# Patient Record
Sex: MALE | Race: WHITE | NOT HISPANIC OR LATINO
[De-identification: names, ages, dates, MRNs, and addresses within clinical notes are randomized per-mention and may not be internally consistent; named-entity substitution may affect disease eponyms.]

---

## 2017-08-28 PROBLEM — Z00.00 ENCOUNTER FOR PREVENTIVE HEALTH EXAMINATION: Status: ACTIVE | Noted: 2017-08-28

## 2017-09-07 ENCOUNTER — APPOINTMENT (OUTPATIENT)
Dept: OTOLARYNGOLOGY | Facility: CLINIC | Age: 72
End: 2017-09-07
Payer: MEDICARE

## 2017-09-07 ENCOUNTER — APPOINTMENT (OUTPATIENT)
Dept: PHARMACY | Facility: CLINIC | Age: 72
End: 2017-09-07
Payer: MEDICARE

## 2017-09-07 ENCOUNTER — APPOINTMENT (OUTPATIENT)
Dept: OTOLARYNGOLOGY | Facility: CLINIC | Age: 72
End: 2017-09-07
Payer: COMMERCIAL

## 2017-09-07 VITALS — HEART RATE: 75 BPM | OXYGEN SATURATION: 97 % | DIASTOLIC BLOOD PRESSURE: 71 MMHG | SYSTOLIC BLOOD PRESSURE: 110 MMHG

## 2017-09-07 VITALS — HEIGHT: 69 IN | BODY MASS INDEX: 22.22 KG/M2 | WEIGHT: 150 LBS

## 2017-09-07 DIAGNOSIS — H90.42 SENSORINEURAL HEARING LOSS, UNILATERAL, LEFT EAR, WITH UNRESTRICTED HEARING ON THE CONTRALATERAL SIDE: ICD-10-CM

## 2017-09-07 DIAGNOSIS — H91.90 UNSPECIFIED HEARING LOSS, UNSPECIFIED EAR: ICD-10-CM

## 2017-09-07 DIAGNOSIS — Z78.9 OTHER SPECIFIED HEALTH STATUS: ICD-10-CM

## 2017-09-07 PROCEDURE — 99202 OFFICE O/P NEW SF 15 MIN: CPT | Mod: NC

## 2017-09-07 PROCEDURE — V5010 ASSESSMENT FOR HEARING AID: CPT

## 2017-09-07 PROCEDURE — 92550 TYMPANOMETRY & REFLEX THRESH: CPT

## 2017-09-07 PROCEDURE — 92557 COMPREHENSIVE HEARING TEST: CPT

## 2017-09-07 RX ORDER — ATORVASTATIN CALCIUM 80 MG/1
TABLET, FILM COATED ORAL
Refills: 0 | Status: ACTIVE | COMMUNITY

## 2017-09-15 ENCOUNTER — APPOINTMENT (OUTPATIENT)
Dept: PHARMACY | Facility: CLINIC | Age: 72
End: 2017-09-15

## 2017-09-15 ENCOUNTER — APPOINTMENT (OUTPATIENT)
Dept: PHARMACY | Facility: CLINIC | Age: 72
End: 2017-09-15
Payer: SELF-PAY

## 2017-09-15 PROCEDURE — V5299A: CUSTOM | Mod: NC

## 2017-09-20 ENCOUNTER — APPOINTMENT (OUTPATIENT)
Dept: PHARMACY | Facility: CLINIC | Age: 72
End: 2017-09-20
Payer: SELF-PAY

## 2017-09-20 PROCEDURE — V5299A: CUSTOM | Mod: NC

## 2018-06-09 ENCOUNTER — INPATIENT (INPATIENT)
Facility: HOSPITAL | Age: 73
LOS: 0 days | Discharge: HOME | DRG: 131 | End: 2018-06-10
Attending: INTERNAL MEDICINE | Admitting: INTERNAL MEDICINE
Payer: COMMERCIAL

## 2018-06-09 VITALS
TEMPERATURE: 98 F | SYSTOLIC BLOOD PRESSURE: 127 MMHG | OXYGEN SATURATION: 100 % | HEART RATE: 72 BPM | DIASTOLIC BLOOD PRESSURE: 76 MMHG | RESPIRATION RATE: 18 BRPM

## 2018-06-09 DIAGNOSIS — E78.5 HYPERLIPIDEMIA, UNSPECIFIED: ICD-10-CM

## 2018-06-09 DIAGNOSIS — Z90.2 ACQUIRED ABSENCE OF LUNG [PART OF]: Chronic | ICD-10-CM

## 2018-06-09 DIAGNOSIS — E87.1 HYPO-OSMOLALITY AND HYPONATREMIA: ICD-10-CM

## 2018-06-09 DIAGNOSIS — R55 SYNCOPE AND COLLAPSE: ICD-10-CM

## 2018-06-09 DIAGNOSIS — S02.609B FRACTURE OF MANDIBLE, UNSPECIFIED, INITIAL ENCOUNTER FOR OPEN FRACTURE: ICD-10-CM

## 2018-06-09 DIAGNOSIS — Z29.9 ENCOUNTER FOR PROPHYLACTIC MEASURES, UNSPECIFIED: ICD-10-CM

## 2018-06-09 DIAGNOSIS — R63.8 OTHER SYMPTOMS AND SIGNS CONCERNING FOOD AND FLUID INTAKE: ICD-10-CM

## 2018-06-09 LAB
ALBUMIN SERPL ELPH-MCNC: 4.1 G/DL — SIGNIFICANT CHANGE UP (ref 3.3–5)
ALP SERPL-CCNC: 68 U/L — SIGNIFICANT CHANGE UP (ref 40–120)
ALT FLD-CCNC: 19 U/L — SIGNIFICANT CHANGE UP (ref 10–45)
ANION GAP SERPL CALC-SCNC: 12 MMOL/L — SIGNIFICANT CHANGE UP (ref 5–17)
ANION GAP SERPL CALC-SCNC: 16 MMOL/L — SIGNIFICANT CHANGE UP (ref 5–17)
ANION GAP SERPL CALC-SCNC: 17 MMOL/L — SIGNIFICANT CHANGE UP (ref 5–17)
ANION GAP SERPL CALC-SCNC: 9 MMOL/L — SIGNIFICANT CHANGE UP (ref 5–17)
AST SERPL-CCNC: 34 U/L — SIGNIFICANT CHANGE UP (ref 10–40)
BILIRUB SERPL-MCNC: 0.3 MG/DL — SIGNIFICANT CHANGE UP (ref 0.2–1.2)
BUN SERPL-MCNC: 10 MG/DL — SIGNIFICANT CHANGE UP (ref 7–23)
BUN SERPL-MCNC: 14 MG/DL — SIGNIFICANT CHANGE UP (ref 7–23)
BUN SERPL-MCNC: 18 MG/DL — SIGNIFICANT CHANGE UP (ref 7–23)
BUN SERPL-MCNC: 19 MG/DL — SIGNIFICANT CHANGE UP (ref 7–23)
CALCIUM SERPL-MCNC: 8.9 MG/DL — SIGNIFICANT CHANGE UP (ref 8.4–10.5)
CALCIUM SERPL-MCNC: 9 MG/DL — SIGNIFICANT CHANGE UP (ref 8.4–10.5)
CALCIUM SERPL-MCNC: 9.1 MG/DL — SIGNIFICANT CHANGE UP (ref 8.4–10.5)
CALCIUM SERPL-MCNC: 9.7 MG/DL — SIGNIFICANT CHANGE UP (ref 8.4–10.5)
CHLORIDE SERPL-SCNC: 104 MMOL/L — SIGNIFICANT CHANGE UP (ref 96–108)
CHLORIDE SERPL-SCNC: 91 MMOL/L — LOW (ref 96–108)
CHLORIDE SERPL-SCNC: 93 MMOL/L — LOW (ref 96–108)
CHLORIDE SERPL-SCNC: 99 MMOL/L — SIGNIFICANT CHANGE UP (ref 96–108)
CK MB CFR SERPL CALC: 11.1 NG/ML — HIGH (ref 0–6.7)
CK SERPL-CCNC: 453 U/L — HIGH (ref 30–200)
CO2 SERPL-SCNC: 17 MMOL/L — LOW (ref 22–31)
CO2 SERPL-SCNC: 18 MMOL/L — LOW (ref 22–31)
CO2 SERPL-SCNC: 20 MMOL/L — LOW (ref 22–31)
CO2 SERPL-SCNC: 21 MMOL/L — LOW (ref 22–31)
CREAT SERPL-MCNC: 1.01 MG/DL — SIGNIFICANT CHANGE UP (ref 0.5–1.3)
CREAT SERPL-MCNC: 1.1 MG/DL — SIGNIFICANT CHANGE UP (ref 0.5–1.3)
CREAT SERPL-MCNC: 1.14 MG/DL — SIGNIFICANT CHANGE UP (ref 0.5–1.3)
CREAT SERPL-MCNC: 1.19 MG/DL — SIGNIFICANT CHANGE UP (ref 0.5–1.3)
GLUCOSE SERPL-MCNC: 117 MG/DL — HIGH (ref 70–99)
GLUCOSE SERPL-MCNC: 125 MG/DL — HIGH (ref 70–99)
GLUCOSE SERPL-MCNC: 129 MG/DL — HIGH (ref 70–99)
GLUCOSE SERPL-MCNC: 136 MG/DL — HIGH (ref 70–99)
MAGNESIUM SERPL-MCNC: 1.8 MG/DL — SIGNIFICANT CHANGE UP (ref 1.6–2.6)
MAGNESIUM SERPL-MCNC: 1.8 MG/DL — SIGNIFICANT CHANGE UP (ref 1.6–2.6)
OSMOLALITY SERPL: 266 MOSM/KG — LOW (ref 280–301)
OSMOLALITY UR: 257 MOSMOL/KG — SIGNIFICANT CHANGE UP (ref 100–650)
PCP SPEC-MCNC: SIGNIFICANT CHANGE UP
POTASSIUM SERPL-MCNC: 3.7 MMOL/L — SIGNIFICANT CHANGE UP (ref 3.5–5.3)
POTASSIUM SERPL-MCNC: 4.1 MMOL/L — SIGNIFICANT CHANGE UP (ref 3.5–5.3)
POTASSIUM SERPL-MCNC: 4.5 MMOL/L — SIGNIFICANT CHANGE UP (ref 3.5–5.3)
POTASSIUM SERPL-MCNC: 4.8 MMOL/L — SIGNIFICANT CHANGE UP (ref 3.5–5.3)
POTASSIUM SERPL-SCNC: 3.7 MMOL/L — SIGNIFICANT CHANGE UP (ref 3.5–5.3)
POTASSIUM SERPL-SCNC: 4.1 MMOL/L — SIGNIFICANT CHANGE UP (ref 3.5–5.3)
POTASSIUM SERPL-SCNC: 4.5 MMOL/L — SIGNIFICANT CHANGE UP (ref 3.5–5.3)
POTASSIUM SERPL-SCNC: 4.8 MMOL/L — SIGNIFICANT CHANGE UP (ref 3.5–5.3)
PROT SERPL-MCNC: 6.8 G/DL — SIGNIFICANT CHANGE UP (ref 6–8.3)
SODIUM SERPL-SCNC: 125 MMOL/L — LOW (ref 135–145)
SODIUM SERPL-SCNC: 127 MMOL/L — LOW (ref 135–145)
SODIUM SERPL-SCNC: 131 MMOL/L — LOW (ref 135–145)
SODIUM SERPL-SCNC: 134 MMOL/L — LOW (ref 135–145)
SODIUM UR-SCNC: 44 MMOL/L — SIGNIFICANT CHANGE UP
TROPONIN T SERPL-MCNC: <0.01 NG/ML — SIGNIFICANT CHANGE UP (ref 0–0.01)
TSH SERPL-MCNC: 2.59 UIU/ML — SIGNIFICANT CHANGE UP (ref 0.35–4.94)

## 2018-06-09 PROCEDURE — 70450 CT HEAD/BRAIN W/O DYE: CPT | Mod: 26

## 2018-06-09 PROCEDURE — 99223 1ST HOSP IP/OBS HIGH 75: CPT

## 2018-06-09 PROCEDURE — 72125 CT NECK SPINE W/O DYE: CPT | Mod: 26

## 2018-06-09 PROCEDURE — 93010 ELECTROCARDIOGRAM REPORT: CPT | Mod: 76,59

## 2018-06-09 PROCEDURE — 70486 CT MAXILLOFACIAL W/O DYE: CPT | Mod: 26

## 2018-06-09 PROCEDURE — 12051 INTMD RPR FACE/MM 2.5 CM/<: CPT

## 2018-06-09 PROCEDURE — 71045 X-RAY EXAM CHEST 1 VIEW: CPT | Mod: 26

## 2018-06-09 PROCEDURE — 99285 EMERGENCY DEPT VISIT HI MDM: CPT | Mod: 25

## 2018-06-09 RX ORDER — ACETAMINOPHEN 500 MG
650 TABLET ORAL EVERY 6 HOURS
Qty: 0 | Refills: 0 | Status: DISCONTINUED | OUTPATIENT
Start: 2018-06-09 | End: 2018-06-10

## 2018-06-09 RX ORDER — HEPARIN SODIUM 5000 [USP'U]/ML
5000 INJECTION INTRAVENOUS; SUBCUTANEOUS EVERY 8 HOURS
Qty: 0 | Refills: 0 | Status: DISCONTINUED | OUTPATIENT
Start: 2018-06-09 | End: 2018-06-10

## 2018-06-09 RX ORDER — ACETAMINOPHEN 500 MG
1000 TABLET ORAL ONCE
Qty: 0 | Refills: 0 | Status: COMPLETED | OUTPATIENT
Start: 2018-06-09 | End: 2018-06-09

## 2018-06-09 RX ORDER — ASPIRIN/CALCIUM CARB/MAGNESIUM 324 MG
1 TABLET ORAL
Qty: 0 | Refills: 0 | COMMUNITY

## 2018-06-09 RX ORDER — OXYCODONE AND ACETAMINOPHEN 5; 325 MG/1; MG/1
1 TABLET ORAL ONCE
Qty: 0 | Refills: 0 | Status: DISCONTINUED | OUTPATIENT
Start: 2018-06-09 | End: 2018-06-09

## 2018-06-09 RX ORDER — ATORVASTATIN CALCIUM 80 MG/1
20 TABLET, FILM COATED ORAL AT BEDTIME
Qty: 0 | Refills: 0 | Status: DISCONTINUED | OUTPATIENT
Start: 2018-06-09 | End: 2018-06-10

## 2018-06-09 RX ORDER — SUCRALFATE 1 G
10 TABLET ORAL
Qty: 0 | Refills: 0 | COMMUNITY

## 2018-06-09 RX ORDER — ACETAMINOPHEN 500 MG
1000 TABLET ORAL ONCE
Qty: 0 | Refills: 0 | Status: DISCONTINUED | OUTPATIENT
Start: 2018-06-09 | End: 2018-06-09

## 2018-06-09 RX ORDER — TADALAFIL 10 MG/1
2.5 TABLET, FILM COATED ORAL
Qty: 0 | Refills: 0 | COMMUNITY

## 2018-06-09 RX ORDER — SODIUM CHLORIDE 9 MG/ML
1000 INJECTION INTRAMUSCULAR; INTRAVENOUS; SUBCUTANEOUS ONCE
Qty: 0 | Refills: 0 | Status: COMPLETED | OUTPATIENT
Start: 2018-06-09 | End: 2018-06-09

## 2018-06-09 RX ORDER — TETANUS TOXOID, REDUCED DIPHTHERIA TOXOID AND ACELLULAR PERTUSSIS VACCINE, ADSORBED 5; 2.5; 8; 8; 2.5 [IU]/.5ML; [IU]/.5ML; UG/.5ML; UG/.5ML; UG/.5ML
0.5 SUSPENSION INTRAMUSCULAR ONCE
Qty: 0 | Refills: 0 | Status: COMPLETED | OUTPATIENT
Start: 2018-06-09 | End: 2018-06-09

## 2018-06-09 RX ORDER — POTASSIUM CHLORIDE 20 MEQ
40 PACKET (EA) ORAL ONCE
Qty: 0 | Refills: 0 | Status: DISCONTINUED | OUTPATIENT
Start: 2018-06-09 | End: 2018-06-09

## 2018-06-09 RX ORDER — ATORVASTATIN CALCIUM 80 MG/1
1 TABLET, FILM COATED ORAL
Qty: 0 | Refills: 0 | COMMUNITY

## 2018-06-09 RX ORDER — MAGNESIUM SULFATE 500 MG/ML
1 VIAL (ML) INJECTION ONCE
Qty: 0 | Refills: 0 | Status: DISCONTINUED | OUTPATIENT
Start: 2018-06-09 | End: 2018-06-09

## 2018-06-09 RX ORDER — CEFAZOLIN SODIUM 1 G
1000 VIAL (EA) INJECTION ONCE
Qty: 0 | Refills: 0 | Status: COMPLETED | OUTPATIENT
Start: 2018-06-09 | End: 2018-06-09

## 2018-06-09 RX ORDER — RANITIDINE HYDROCHLORIDE 150 MG/1
1 TABLET, FILM COATED ORAL
Qty: 0 | Refills: 0 | COMMUNITY

## 2018-06-09 RX ORDER — FAMOTIDINE 10 MG/ML
20 INJECTION INTRAVENOUS
Qty: 0 | Refills: 0 | Status: DISCONTINUED | OUTPATIENT
Start: 2018-06-09 | End: 2018-06-10

## 2018-06-09 RX ORDER — MELOXICAM 15 MG/1
1 TABLET ORAL
Qty: 0 | Refills: 0 | COMMUNITY

## 2018-06-09 RX ORDER — OXYCODONE AND ACETAMINOPHEN 5; 325 MG/1; MG/1
1 TABLET ORAL EVERY 6 HOURS
Qty: 0 | Refills: 0 | Status: DISCONTINUED | OUTPATIENT
Start: 2018-06-09 | End: 2018-06-09

## 2018-06-09 RX ORDER — SODIUM CHLORIDE 9 MG/ML
1000 INJECTION INTRAMUSCULAR; INTRAVENOUS; SUBCUTANEOUS
Qty: 0 | Refills: 0 | Status: DISCONTINUED | OUTPATIENT
Start: 2018-06-09 | End: 2018-06-10

## 2018-06-09 RX ORDER — MAGNESIUM SULFATE 500 MG/ML
2 VIAL (ML) INJECTION ONCE
Qty: 0 | Refills: 0 | Status: COMPLETED | OUTPATIENT
Start: 2018-06-09 | End: 2018-06-09

## 2018-06-09 RX ADMIN — Medication 400 MILLIGRAM(S): at 06:10

## 2018-06-09 RX ADMIN — Medication 650 MILLIGRAM(S): at 18:45

## 2018-06-09 RX ADMIN — ATORVASTATIN CALCIUM 20 MILLIGRAM(S): 80 TABLET, FILM COATED ORAL at 21:38

## 2018-06-09 RX ADMIN — SODIUM CHLORIDE 150 MILLILITER(S): 9 INJECTION INTRAMUSCULAR; INTRAVENOUS; SUBCUTANEOUS at 17:49

## 2018-06-09 RX ADMIN — Medication 1000 MILLIGRAM(S): at 06:48

## 2018-06-09 RX ADMIN — Medication 100 MILLIGRAM(S): at 04:17

## 2018-06-09 RX ADMIN — SODIUM CHLORIDE 150 MILLILITER(S): 9 INJECTION INTRAMUSCULAR; INTRAVENOUS; SUBCUTANEOUS at 06:24

## 2018-06-09 RX ADMIN — Medication 650 MILLIGRAM(S): at 18:14

## 2018-06-09 RX ADMIN — TETANUS TOXOID, REDUCED DIPHTHERIA TOXOID AND ACELLULAR PERTUSSIS VACCINE, ADSORBED 0.5 MILLILITER(S): 5; 2.5; 8; 8; 2.5 SUSPENSION INTRAMUSCULAR at 03:02

## 2018-06-09 RX ADMIN — Medication 50 GRAM(S): at 07:35

## 2018-06-09 RX ADMIN — Medication 1 DROP(S): at 18:15

## 2018-06-09 RX ADMIN — OXYCODONE AND ACETAMINOPHEN 1 TABLET(S): 5; 325 TABLET ORAL at 22:30

## 2018-06-09 RX ADMIN — FAMOTIDINE 20 MILLIGRAM(S): 10 INJECTION INTRAVENOUS at 17:50

## 2018-06-09 RX ADMIN — SODIUM CHLORIDE 1000 MILLILITER(S): 9 INJECTION INTRAMUSCULAR; INTRAVENOUS; SUBCUTANEOUS at 03:03

## 2018-06-09 RX ADMIN — SODIUM CHLORIDE 150 MILLILITER(S): 9 INJECTION INTRAMUSCULAR; INTRAVENOUS; SUBCUTANEOUS at 23:45

## 2018-06-09 RX ADMIN — Medication 1 DROP(S): at 23:54

## 2018-06-09 NOTE — ED PROVIDER NOTE - OBJECTIVE STATEMENT
73M hx vasovagal syncope in the past, s/p syncopal event. pt states was playing poker and suddenly felt lightheaded. states he felt like he was going to pass out and tried to put his head down.  however passed out before he could. +hit face, +laceration to chin. c/o jaw pain.  unk last tetanus. no vomiting, no fevers. no chest pain, nO SOB. no numbness/weakness.  c/o mild HA.  states has been worked up by cardiologist in past.    during HPI - pt again stated he felt lightheaded and passed out in stretcher.  was unresponsive for a few seconds.  repeat EKG performed.  pt placed on cardiac monitor, no hypotension.  FS WNL

## 2018-06-09 NOTE — H&P ADULT - ATTENDING COMMENTS
-pt seen and examined  -episode of probable vasovagal syncope provoked by etoh, thc, massage  -advised to avoid these things including   -na noted to be low, with low urine osm, but improving with IVF, plan to recheck, bmp, if remains low, consult nephrology  -EKG unremarkable other 1st deg AV block  -obtain echo  -stop cialis  -no contraindication CV wise to indicated ENT surgery, ideally have NA optimized 1st

## 2018-06-09 NOTE — ED PROVIDER NOTE - PROGRESS NOTE DETAILS
laceration repaired, bacitracin applied. given ancef spoke with Dr. Cuba on for OMFS  will admit to cardiology for multiple syncopal episodes, will need continued cardiac monitoring

## 2018-06-09 NOTE — H&P ADULT - ASSESSMENT
72 yo M w/ PMHx of multiple vasovagal episodes, Stage 1A lung adenocarcinoma s/p LLL resection (2016), HLD presents after syncopal episode found to have multiple mandibular fractures that will require surgical intervention.

## 2018-06-09 NOTE — H&P ADULT - NSHPLABSRESULTS_GEN_ALL_CORE
LABS:                         11.8   11.8  )-----------( 248      ( 2018 02:26 )             32.6         127<L>  |  93<L>  |  18  ----------------------------<  129<H>  4.1   |  17<L>  |  1.14    Ca    8.9      2018 05:15  Mg     1.8         TPro  6.8  /  Alb  4.1  /  TBili  0.3  /  DBili  x   /  AST  34  /  ALT  19  /  AlkPhos  68  06-    PT/INR - ( 2018 02:26 )   PT: 12.3 sec;   INR: 1.11          PTT - ( 2018 02:26 )  PTT:27.9 sec  Urinalysis Basic - ( 2018 03:44 )    Color: Yellow / Appearance: Clear / S.010 / pH: x  Gluc: x / Ketone: Trace mg/dL  / Bili: Negative / Urobili: 0.2 E.U./dL   Blood: x / Protein: NEGATIVE mg/dL / Nitrite: NEGATIVE   Leuk Esterase: NEGATIVE / RBC: x / WBC x   Sq Epi: x / Non Sq Epi: x / Bacteria: x      CARDIAC MARKERS ( 2018 05:15 )  x     / <0.01 ng/mL / 499 U/L / x     / 11.1 ng/mL  CARDIAC MARKERS ( 2018 02:26 )  x     / <0.01 ng/mL / 453 U/L / x     / 10.0 ng/mL            RADIOLOGY, EKG & ADDITIONAL TESTS:   EKG: NSR, 1st degree AV block

## 2018-06-09 NOTE — CONSULT NOTE ADULT - SUBJECTIVE AND OBJECTIVE BOX
74 yo M w/ PMHx of multiple vasovagal episodes, Stage 1A lung adenocarcinoma s/p LLL resection (2016), HLD presents after syncopal episode. As per admission history:  Pt reports that he has had nearly 30 vasovagal episodes in the past, previously thought to be 2/2 BB use (atenolol) which has been stopped since 2016; frequency of spells has decreased since BB use.  Pt follows regularly w/ his Cardiologist (Dr. Wade Henning) at [facility].  Pt reports that yesterday he was in the sun playing volleyball; in the evening, he was playing poker, had 3 drinks and smoked marijuana.  He got a massage and soon after, felt lightheaded and knew that he was about to "vasovagal" and tried to walk over to a couch; however, he fell on his face.  Pt took Cialis last night, denies previous adverse reaction to medication. Pt can recall event. Endorses malocclusion, bilateral facial pain and pain at chin.     Exam:  Gen: Lying in bed, pleasant, NAD  Neuro: a/ox3, CNII-XII grossly intact bilaterally  HEENT: repaired chin laceration, tenderness and mild edema at bilateral face overlying mandibular condyles, pain when trying to occlude dentition, teeth #23-26 mobile, FOM ecchymosis without elevation, no mandibular mobility.

## 2018-06-09 NOTE — ED ADULT NURSE NOTE - OBJECTIVE STATEMENT
74 y/o male presents to the ED s/p witnessed syncopal fall to ground. Pt. states, "I had a vasal vagal response and hit the floor." Pt is observed to have deep open wound to chin approximately 2.5 cm in length and 0.2cm in diameter. Pt reports falling to ground face first, then waking up to find gash. Wound dressing applied. Pt speaks full sentences, denies any other pain. MAEx4, +PERRL, unlabored breathing. Abd soft nt nd. SKin dry, warm. Pt placed in cervical collar at this time until cleared by xray. Spinal precautions placed. Wound dressing applied.

## 2018-06-09 NOTE — ED PROVIDER NOTE - CARE PLAN
Principal Discharge DX:	Syncope, unspecified syncope type  Secondary Diagnosis:	Open fracture of mandible, unspecified laterality, unspecified mandibular site, initial encounter  Secondary Diagnosis:	Facial laceration, initial encounter

## 2018-06-09 NOTE — CONSULT NOTE ADULT - ASSESSMENT
Displaced right mandibular condylar fracture  Non displaced left mandibular condylar fracture  Mandibular alveolar fracture including teeth #23-26, Non displaced mandibular symphysis fracture    Discussed surgical options with patient including but not limited to ORIF condylar and mandibular fractures. Discussed risks and benefits including paresthesia, infection, bleeding, malunion, non union. As per discussion with patient and Dr. David Alford, plan will be for Closed reduction of fractures in the OR under GA tomorrow

## 2018-06-09 NOTE — H&P ADULT - PROBLEM SELECTOR PLAN 3
Pt w/ significant multiple mandibular fractures.  Pt was seen by Dr. Cuba ([facility]).  Surgery tentatively planned for tomorrow.   - NPO after MN  - Active T&S  - Pt will require cardiac clearance

## 2018-06-09 NOTE — ED PROVIDER NOTE - SECONDARY DIAGNOSIS.
Open fracture of mandible, unspecified laterality, unspecified mandibular site, initial encounter Facial laceration, initial encounter

## 2018-06-09 NOTE — H&P ADULT - PROBLEM SELECTOR PLAN 1
Pt w/ extensive hx of syncopal episodes.  Follows w/ cardiology as oupt, had a positive tilt table test in 2016.  Orthostatics here negative.  Echo 06/16 - mild LV thickening, mild-mod MR and AI, mild TR - no LVEF reported. VSS. EKG w/ 1st degree AV block.   - Consider repeat echo  - F/u AM EKG  - Replete lytes  - Treat hyponatremia

## 2018-06-09 NOTE — ED ADULT TRIAGE NOTE - ARRIVAL INFO ADDITIONAL COMMENTS
Pt walked into ED with c/o of vaso vagal episode he had syncope and had LOC for 10 seconds witnessed by a friend. Onset was an hour ago. He fell face forward. He has a frontal 2/10 HA and jaw pain. Unaware of last tetanus. He denies Cp, fever, chills, SOB, visual changes. No deformity noted, Mild bleeding and laceration to the chin. He states he had a couple of drinks, felt dehydrated and exercised a lot today

## 2018-06-09 NOTE — H&P ADULT - HISTORY OF PRESENT ILLNESS
72 yo M w/ PMHx of multiple vasovagal episodes, Stage 1A lung adenocarcinoma s/p LLL resection (2016), HLD presents after syncopal episode.  Pt reports that he has had nearly 30 vasovagal episodes in the past, previously thought to be 2/2 BB use (atenolol) which has been stopped since 2016; frequency of spells has decreased since BB use.  Pt follows regularly w/ his cardiologist at [facility].  Pt reports that yesterday he was in the sun playing volleyball; in the evening, he was playing poker, had 3 drinks and smoked marijuana.  He got a massage and soon after, felt lightheaded and knew that he was about to "vasovagal" and tried to walk over to a couch; however, he fell on his face.  Pt can recall event, no tongue biting or enuresis. Witnesses denied any jerking movements. Pt was brought to ED where he had his laceration repaired.  After hitting his head, pt reports HA and nausea which were not present prior to event.  Pt reports that his cardiologist has told him previously to increase his salt intake - denies being informed of hyponatremia in the past. ROS negative for HA, changes in vision, fevers, cough, SOB, CP, abdominal pain, diarrhea, dysuria and LE edema.     During interview w/ ED physician, pt again reported that he felt lightheaded and lost consciousness/became unresponsive for a few seconds.  FS was WNL, EKG significant for NSR, 1st degree AV block.  His BP was reportedly normal. 74 yo M w/ PMHx of multiple vasovagal episodes, Stage 1A lung adenocarcinoma s/p LLL resection (2016), HLD presents after syncopal episode.  Pt reports that he has had nearly 30 vasovagal episodes in the past, previously thought to be 2/2 BB use (atenolol) which has been stopped since 2016; frequency of spells has decreased since BB use.  Pt follows regularly w/ his cardiologist at [facility].  Pt reports that yesterday he was in the sun playing volleyball; in the evening, he was playing poker, had 3 drinks and smoked marijuana.  He got a massage and soon after, felt lightheaded and knew that he was about to "vasovagal" and tried to walk over to a couch; however, he fell on his face.  Of note, pt took Cialis last night, denies previous adverse reaction to medication. Pt can recall event, no tongue biting or enuresis. Witnesses denied any jerking movements. Pt was brought to ED where he had his laceration repaired.  After hitting his head, pt reports HA and nausea which were not present prior to event.  Pt reports that his cardiologist has told him previously to increase his salt intake - denies being informed of hyponatremia in the past. ROS negative for HA, changes in vision, fevers, cough, SOB, CP, abdominal pain, diarrhea, dysuria and LE edema.     During interview w/ ED physician, pt again reported that he felt lightheaded and lost consciousness/became unresponsive for a few seconds.  FS was WNL, EKG significant for NSR, 1st degree AV block.  His BP was reportedly normal.

## 2018-06-10 ENCOUNTER — TRANSCRIPTION ENCOUNTER (OUTPATIENT)
Age: 73
End: 2018-06-10

## 2018-06-10 ENCOUNTER — RESULT REVIEW (OUTPATIENT)
Age: 73
End: 2018-06-10

## 2018-06-10 VITALS — TEMPERATURE: 98 F

## 2018-06-10 LAB
ANION GAP SERPL CALC-SCNC: 9 MMOL/L — SIGNIFICANT CHANGE UP (ref 5–17)
APTT BLD: 31.1 SEC — SIGNIFICANT CHANGE UP (ref 27.5–37.4)
BLD GP AB SCN SERPL QL: NEGATIVE — SIGNIFICANT CHANGE UP
BUN SERPL-MCNC: 7 MG/DL — SIGNIFICANT CHANGE UP (ref 7–23)
CALCIUM SERPL-MCNC: 8.6 MG/DL — SIGNIFICANT CHANGE UP (ref 8.4–10.5)
CHLORIDE SERPL-SCNC: 104 MMOL/L — SIGNIFICANT CHANGE UP (ref 96–108)
CO2 SERPL-SCNC: 22 MMOL/L — SIGNIFICANT CHANGE UP (ref 22–31)
CREAT SERPL-MCNC: 1 MG/DL — SIGNIFICANT CHANGE UP (ref 0.5–1.3)
GLUCOSE SERPL-MCNC: 117 MG/DL — HIGH (ref 70–99)
HCT VFR BLD CALC: 33.6 % — LOW (ref 39–50)
HGB BLD-MCNC: 11.5 G/DL — LOW (ref 13–17)
INR BLD: 1.14 — SIGNIFICANT CHANGE UP (ref 0.88–1.16)
MAGNESIUM SERPL-MCNC: 2.1 MG/DL — SIGNIFICANT CHANGE UP (ref 1.6–2.6)
MCHC RBC-ENTMCNC: 31.6 PG — SIGNIFICANT CHANGE UP (ref 27–34)
MCHC RBC-ENTMCNC: 34.2 G/DL — SIGNIFICANT CHANGE UP (ref 32–36)
MCV RBC AUTO: 92.3 FL — SIGNIFICANT CHANGE UP (ref 80–100)
PHOSPHATE SERPL-MCNC: 3 MG/DL — SIGNIFICANT CHANGE UP (ref 2.5–4.5)
PLATELET # BLD AUTO: 233 K/UL — SIGNIFICANT CHANGE UP (ref 150–400)
POTASSIUM SERPL-MCNC: 4.2 MMOL/L — SIGNIFICANT CHANGE UP (ref 3.5–5.3)
POTASSIUM SERPL-SCNC: 4.2 MMOL/L — SIGNIFICANT CHANGE UP (ref 3.5–5.3)
PROTHROM AB SERPL-ACNC: 12.7 SEC — SIGNIFICANT CHANGE UP (ref 9.8–12.7)
RBC # BLD: 3.64 M/UL — LOW (ref 4.2–5.8)
RBC # FLD: 12.7 % — SIGNIFICANT CHANGE UP (ref 10.3–16.9)
RH IG SCN BLD-IMP: POSITIVE — SIGNIFICANT CHANGE UP
SODIUM SERPL-SCNC: 135 MMOL/L — SIGNIFICANT CHANGE UP (ref 135–145)
WBC # BLD: 6.8 K/UL — SIGNIFICANT CHANGE UP (ref 3.8–10.5)
WBC # FLD AUTO: 6.8 K/UL — SIGNIFICANT CHANGE UP (ref 3.8–10.5)

## 2018-06-10 RX ORDER — BACITRACIN ZINC 500 UNIT/G
1 OINTMENT IN PACKET (EA) TOPICAL
Qty: 1 | Refills: 0 | OUTPATIENT
Start: 2018-06-10 | End: 2018-06-12

## 2018-06-10 RX ORDER — OXYCODONE HYDROCHLORIDE 5 MG/1
5 TABLET ORAL
Qty: 200 | Refills: 0 | OUTPATIENT
Start: 2018-06-10

## 2018-06-10 RX ORDER — OXYCODONE HYDROCHLORIDE 5 MG/1
5 TABLET ORAL EVERY 4 HOURS
Qty: 0 | Refills: 0 | Status: DISCONTINUED | OUTPATIENT
Start: 2018-06-10 | End: 2018-06-10

## 2018-06-10 RX ORDER — CHLORHEXIDINE GLUCONATE 213 G/1000ML
15 SOLUTION TOPICAL
Qty: 450 | Refills: 3 | OUTPATIENT
Start: 2018-06-10 | End: 2018-07-19

## 2018-06-10 RX ORDER — MORPHINE SULFATE 50 MG/1
2 CAPSULE, EXTENDED RELEASE ORAL
Qty: 0 | Refills: 0 | Status: DISCONTINUED | OUTPATIENT
Start: 2018-06-10 | End: 2018-06-10

## 2018-06-10 RX ORDER — ACETAMINOPHEN 500 MG
2 TABLET ORAL
Qty: 0 | Refills: 0 | COMMUNITY
Start: 2018-06-10

## 2018-06-10 RX ORDER — SODIUM CHLORIDE 9 MG/ML
500 INJECTION, SOLUTION INTRAVENOUS
Qty: 0 | Refills: 0 | Status: DISCONTINUED | OUTPATIENT
Start: 2018-06-10 | End: 2018-06-10

## 2018-06-10 RX ORDER — ONDANSETRON 8 MG/1
4 TABLET, FILM COATED ORAL EVERY 4 HOURS
Qty: 0 | Refills: 0 | Status: DISCONTINUED | OUTPATIENT
Start: 2018-06-10 | End: 2018-06-10

## 2018-06-10 RX ADMIN — OXYCODONE AND ACETAMINOPHEN 1 TABLET(S): 5; 325 TABLET ORAL at 00:27

## 2018-06-10 RX ADMIN — Medication 300 MILLIGRAM(S): at 15:26

## 2018-06-10 RX ADMIN — Medication 1 DROP(S): at 05:11

## 2018-06-10 RX ADMIN — SODIUM CHLORIDE 75 MILLILITER(S): 9 INJECTION, SOLUTION INTRAVENOUS at 11:00

## 2018-06-10 RX ADMIN — SODIUM CHLORIDE 150 MILLILITER(S): 9 INJECTION INTRAMUSCULAR; INTRAVENOUS; SUBCUTANEOUS at 05:57

## 2018-06-10 NOTE — BRIEF OPERATIVE NOTE - PROCEDURE
<<-----Click on this checkbox to enter Procedure Closed reduction of fracture of mandible  06/10/2018    Active  LMOSES  Tooth extraction, multiple  06/10/2018    Active  LMOSES

## 2018-06-10 NOTE — DISCHARGE NOTE ADULT - HOSPITAL COURSE
73M presented to the ER after a reported vasovagal episode druing which he fell and hit his chin. CT maxillofacial showed bilateral condylar fractures as well as an anterior mandibular alveolar fracture. A chin laceration was repaired in the emergency room. The patient was admitted and worked up for syncope however his fall was felt to be due to multiple factors and not likely due to a cardiac event, he will follow up with his own cardiologist. On 6/10 he underwent extraction of several teeth as well as closed reduction of bilateral mandibular condyle and anterior alveolar fractures with placement of arch bars. Postoperatively he did well with minimal pain. He was able to drink liquids without difficulty and denied any nausea. He was stable for discharge home in the afternoon of POD0.

## 2018-06-10 NOTE — DISCHARGE NOTE ADULT - ADDITIONAL INSTRUCTIONS
Activity: No strenuous activity (running, volleyball, weight lifting) for 10 days. Walking is encouraged. You may brush the front teeth gently with a soft tooth brush.   Bathing: You may shower normally.   Medications: Take tylenol and ibuprofen alternating for pain. You can also take oxycodone for moderate to severe pain every 4 hours as needed. Take antibiotics (clindamycin) every 8 hours for 10 days. Use peridex mouth rinse 3x daily. Apply bacitracin to the laceration on your chin twice per day for 3 days. You may continue taking your regular home medications, crush pills if needed.   Follow up: You should see Dr. Alford in the office in 10 days, call to schedule an appointment.

## 2018-06-10 NOTE — DISCHARGE NOTE ADULT - PATIENT PORTAL LINK FT
You can access the [facility] Patient Portal, offered by [facility], by registering with the following website: [email or web address]

## 2018-06-10 NOTE — DISCHARGE NOTE ADULT - CARE PLAN
Principal Discharge DX:	Mandible fracture  Goal:	Reduction of multiple mandibular fractures  Assessment and plan of treatment:	s/p extraction of teeth #3,4,5 with bone grafting and collagen membrane placement, closed reduction of bilateral condylar fractures and anterior mandibular alveolar fracture with arch bars. Plan to discharge home with rubber bands in place.

## 2018-06-10 NOTE — DISCHARGE NOTE ADULT - MEDICATION SUMMARY - MEDICATIONS TO TAKE
I will START or STAY ON the medications listed below when I get home from the hospital:    Cialis  -- 2.5 milligram(s) by mouth once a day, As Needed  -- Indication: For Need for prophylactic measure    aspirin 325 mg oral tablet  -- 1 tab(s) by mouth once a day  -- Indication: For HLD (hyperlipidemia)    meloxicam 15 mg oral tablet  -- 1 tab(s) by mouth once a day, As Needed  -- Indication: For HLD (hyperlipidemia)    oxyCODONE 5 mg/5 mL oral solution  -- 5 milliliter(s) by mouth every 4 hours, As Needed MDD:30 mL  -- Caution federal law prohibits the transfer of this drug to any person other  than the person for whom it was prescribed.  May cause drowsiness.  Alcohol may intensify this effect.  Use care when operating dangerous machinery.  This prescription cannot be refilled.  Using more of this medication than prescribed may cause serious breathing problems.    -- Indication: For Open fracture of mandible, unspecified laterality, unspecified mandibular site, initial encounter    acetaminophen 325 mg oral tablet  -- 2 tab(s) by mouth every 6 hours, As needed, Mild Pain (1 - 3)  -- Indication: For Open fracture of mandible, unspecified laterality, unspecified mandibular site, initial encounter    Lipitor 20 mg oral tablet  -- 1 tab(s) by mouth once a day  -- Indication: For HLD (hyperlipidemia)    Peridex 0.12% mucous membrane liquid  -- 15 milliliter(s) by mouth 3 times a day   -- Indication: For Open fracture of mandible, unspecified laterality, unspecified mandibular site, initial encounter    bacitracin 500 units/g topical ointment  -- Apply on skin to affected area 2 times a day   -- For external use only.    -- Indication: For Facial laceration, initial encounter    raNITIdine 300 mg oral tablet  -- 1 tab(s) by mouth 2 times a day  -- Indication: For Need for prophylactic measure    clindamycin 75 mg/5 mL oral liquid  -- 20 milliliter(s) by mouth every 8 hours   -- Expires___________________  Finish all this medication unless otherwise directed by prescriber.  Medication should be taken with plenty of water.  Shake well before use.    -- Indication: For Open fracture of mandible, unspecified laterality, unspecified mandibular site, initial encounter    Carafate 1 g/10 mL oral suspension  -- 10 milliliter(s) by mouth once a day  -- Indication: For Need for prophylactic measure I will START or STAY ON the medications listed below when I get home from the hospital:    Cialis  -- 2.5 milligram(s) by mouth once a day, As Needed  -- Indication: For Need for prophylactic measure    aspirin 325 mg oral tablet  -- 1 tab(s) by mouth once a day  -- Indication: For HLD (hyperlipidemia)    meloxicam 15 mg oral tablet  -- 1 tab(s) by mouth once a day, As Needed  -- Indication: For HLD (hyperlipidemia)    acetaminophen 325 mg oral tablet  -- 2 tab(s) by mouth every 6 hours, As needed, Mild Pain (1 - 3)  -- Indication: For Open fracture of mandible, unspecified laterality, unspecified mandibular site, initial encounter    hydrocodone-acetaminophen 7.5 mg-325 mg/15 mL oral solution  -- 15 milliliter(s) by mouth every 4 hours, As Needed MDD:90 mL  -- Caution federal law prohibits the transfer of this drug to any person other  than the person for whom it was prescribed.  Do not drink alcoholic beverages when taking this medication.  This drug may impair the ability to drive or operate machinery.  Use care until you become familiar with its effects.  This product contains acetaminophen.  Do not use  with any other product containing acetaminophen to prevent possible liver damage.  Using more of this medication than prescribed may cause serious breathing problems.    -- Indication: For Open fracture of mandible, unspecified laterality, unspecified mandibular site, initial encounter    Lipitor 20 mg oral tablet  -- 1 tab(s) by mouth once a day  -- Indication: For HLD (hyperlipidemia)    Peridex 0.12% mucous membrane liquid  -- 15 milliliter(s) by mouth 3 times a day   -- Indication: For Open fracture of mandible, unspecified laterality, unspecified mandibular site, initial encounter    bacitracin 500 units/g topical ointment  -- Apply on skin to affected area 2 times a day   -- For external use only.    -- Indication: For Facial laceration, initial encounter    raNITIdine 300 mg oral tablet  -- 1 tab(s) by mouth 2 times a day  -- Indication: For Need for prophylactic measure    clindamycin 75 mg/5 mL oral liquid  -- 20 milliliter(s) by mouth every 8 hours   -- Expires___________________  Finish all this medication unless otherwise directed by prescriber.  Medication should be taken with plenty of water.  Shake well before use.    -- Indication: For Open fracture of mandible, unspecified laterality, unspecified mandibular site, initial encounter    clindamycin 75 mg/5 mL oral liquid  -- 20 milliliter(s) by mouth every 8 hours   -- Expires___________________  Finish all this medication unless otherwise directed by prescriber.  Medication should be taken with plenty of water.  Shake well before use.    -- Indication: For Open fracture of mandible, unspecified laterality, unspecified mandibular site, initial encounter    Carafate 1 g/10 mL oral suspension  -- 10 milliliter(s) by mouth once a day  -- Indication: For Need for prophylactic measure I will START or STAY ON the medications listed below when I get home from the hospital:    Cialis  -- 2.5 milligram(s) by mouth once a day, As Needed  -- Indication: For Need for prophylactic measure    aspirin 325 mg oral tablet  -- 1 tab(s) by mouth once a day  -- Indication: For HLD (hyperlipidemia)    meloxicam 15 mg oral tablet  -- 1 tab(s) by mouth once a day, As Needed  -- Indication: For HLD (hyperlipidemia)    acetaminophen 325 mg oral tablet  -- 2 tab(s) by mouth every 6 hours, As needed, Mild Pain (1 - 3)  -- Indication: For Open fracture of mandible, unspecified laterality, unspecified mandibular site, initial encounter    hydrocodone-acetaminophen 7.5 mg-325 mg/15 mL oral solution  -- 15 milliliter(s) by mouth every 4 hours, As Needed MDD:90 mL  -- Caution federal law prohibits the transfer of this drug to any person other  than the person for whom it was prescribed.  Do not drink alcoholic beverages when taking this medication.  This drug may impair the ability to drive or operate machinery.  Use care until you become familiar with its effects.  This product contains acetaminophen.  Do not use  with any other product containing acetaminophen to prevent possible liver damage.  Using more of this medication than prescribed may cause serious breathing problems.    -- Indication: For Open fracture of mandible, unspecified laterality, unspecified mandibular site, initial encounter    hydrocodone-acetaminophen 7.5 mg-325 mg/15 mL oral solution  -- 15 milliliter(s) by mouth every 4 hours, As Needed MDD:90 mL  -- Caution federal law prohibits the transfer of this drug to any person other  than the person for whom it was prescribed.  Do not drink alcoholic beverages when taking this medication.  This drug may impair the ability to drive or operate machinery.  Use care until you become familiar with its effects.  This product contains acetaminophen.  Do not use  with any other product containing acetaminophen to prevent possible liver damage.  Using more of this medication than prescribed may cause serious breathing problems.    -- Indication: For Open fracture of mandible, unspecified laterality, unspecified mandibular site, initial encounter    Lipitor 20 mg oral tablet  -- 1 tab(s) by mouth once a day  -- Indication: For HLD (hyperlipidemia)    Peridex 0.12% mucous membrane liquid  -- 15 milliliter(s) by mouth 3 times a day   -- Indication: For Open fracture of mandible, unspecified laterality, unspecified mandibular site, initial encounter    bacitracin 500 units/g topical ointment  -- Apply on skin to affected area 2 times a day   -- For external use only.    -- Indication: For Facial laceration, initial encounter    raNITIdine 300 mg oral tablet  -- 1 tab(s) by mouth 2 times a day  -- Indication: For Need for prophylactic measure    clindamycin 75 mg/5 mL oral liquid  -- 20 milliliter(s) by mouth every 8 hours   -- Expires___________________  Finish all this medication unless otherwise directed by prescriber.  Medication should be taken with plenty of water.  Shake well before use.    -- Indication: For Open fracture of mandible, unspecified laterality, unspecified mandibular site, initial encounter    clindamycin 75 mg/5 mL oral liquid  -- 20 milliliter(s) by mouth every 8 hours   -- Expires___________________  Finish all this medication unless otherwise directed by prescriber.  Medication should be taken with plenty of water.  Shake well before use.    -- Indication: For Open fracture of mandible, unspecified laterality, unspecified mandibular site, initial encounter    clindamycin 75 mg/5 mL oral liquid  -- 20 milliliter(s) by mouth every 8 hours   -- Expires___________________  Finish all this medication unless otherwise directed by prescriber.  Medication should be taken with plenty of water.  Shake well before use.    -- Indication: For Open fracture of mandible, unspecified laterality, unspecified mandibular site, initial encounter    Carafate 1 g/10 mL oral suspension  -- 10 milliliter(s) by mouth once a day  -- Indication: For Need for prophylactic measure

## 2018-06-10 NOTE — BRIEF OPERATIVE NOTE - OPERATION/FINDINGS
Multiple fractured teeth extracted #3,4,5, bone graft with collagen membrane placed. Closed reduction of bilateral mandibular condylar fractures and anterior alveolar fracture.

## 2018-06-10 NOTE — DISCHARGE NOTE ADULT - INSTRUCTIONS
Diet: liquid diet, can include shakes and smoothies, recommend Ensure to provide calories and nutrients. If drinking with a straw place on the left side of the mouth. Rinse your mouth with salt water after eating.

## 2018-06-10 NOTE — DISCHARGE NOTE ADULT - PLAN OF CARE
Reduction of multiple mandibular fractures s/p extraction of teeth #3,4,5 with bone grafting and collagen membrane placement, closed reduction of bilateral condylar fractures and anterior mandibular alveolar fracture with arch bars. Plan to discharge home with rubber bands in place.

## 2018-06-12 PROCEDURE — 99285 EMERGENCY DEPT VISIT HI MDM: CPT | Mod: 25

## 2018-06-12 PROCEDURE — 70486 CT MAXILLOFACIAL W/O DYE: CPT

## 2018-06-12 PROCEDURE — 41899 UNLISTED PX DENTALVLR STRUX: CPT

## 2018-06-12 PROCEDURE — 72125 CT NECK SPINE W/O DYE: CPT

## 2018-06-12 PROCEDURE — 12051 INTMD RPR FACE/MM 2.5 CM/<: CPT

## 2018-06-12 PROCEDURE — 87086 URINE CULTURE/COLONY COUNT: CPT

## 2018-06-12 PROCEDURE — 71045 X-RAY EXAM CHEST 1 VIEW: CPT

## 2018-06-12 PROCEDURE — 90715 TDAP VACCINE 7 YRS/> IM: CPT

## 2018-06-12 PROCEDURE — 85027 COMPLETE CBC AUTOMATED: CPT

## 2018-06-12 PROCEDURE — 36415 COLL VENOUS BLD VENIPUNCTURE: CPT

## 2018-06-12 PROCEDURE — 86900 BLOOD TYPING SEROLOGIC ABO: CPT

## 2018-06-12 PROCEDURE — 80053 COMPREHEN METABOLIC PANEL: CPT

## 2018-06-12 PROCEDURE — 80048 BASIC METABOLIC PNL TOTAL CA: CPT

## 2018-06-12 PROCEDURE — 80307 DRUG TEST PRSMV CHEM ANLYZR: CPT

## 2018-06-12 PROCEDURE — 83935 ASSAY OF URINE OSMOLALITY: CPT

## 2018-06-12 PROCEDURE — 85025 COMPLETE CBC W/AUTO DIFF WBC: CPT

## 2018-06-12 PROCEDURE — 88300 SURGICAL PATH GROSS: CPT

## 2018-06-12 PROCEDURE — 83930 ASSAY OF BLOOD OSMOLALITY: CPT

## 2018-06-12 PROCEDURE — 96374 THER/PROPH/DIAG INJ IV PUSH: CPT | Mod: XU

## 2018-06-12 PROCEDURE — 85730 THROMBOPLASTIN TIME PARTIAL: CPT

## 2018-06-12 PROCEDURE — 21210 FACE BONE GRAFT: CPT

## 2018-06-12 PROCEDURE — 70450 CT HEAD/BRAIN W/O DYE: CPT

## 2018-06-12 PROCEDURE — C1889: CPT

## 2018-06-12 PROCEDURE — 81003 URINALYSIS AUTO W/O SCOPE: CPT

## 2018-06-12 PROCEDURE — 85610 PROTHROMBIN TIME: CPT

## 2018-06-12 PROCEDURE — 90471 IMMUNIZATION ADMIN: CPT

## 2018-06-12 PROCEDURE — 86901 BLOOD TYPING SEROLOGIC RH(D): CPT

## 2018-06-12 PROCEDURE — 12011 RPR F/E/E/N/L/M 2.5 CM/<: CPT

## 2018-06-12 PROCEDURE — 82553 CREATINE MB FRACTION: CPT

## 2018-06-12 PROCEDURE — 82550 ASSAY OF CK (CPK): CPT

## 2018-06-12 PROCEDURE — 83735 ASSAY OF MAGNESIUM: CPT

## 2018-06-12 PROCEDURE — 86850 RBC ANTIBODY SCREEN: CPT

## 2018-06-12 PROCEDURE — 84484 ASSAY OF TROPONIN QUANT: CPT

## 2018-06-12 PROCEDURE — 82962 GLUCOSE BLOOD TEST: CPT

## 2018-06-12 PROCEDURE — 93005 ELECTROCARDIOGRAM TRACING: CPT | Mod: 76

## 2018-06-12 PROCEDURE — 84300 ASSAY OF URINE SODIUM: CPT

## 2018-06-12 PROCEDURE — 84100 ASSAY OF PHOSPHORUS: CPT

## 2018-06-12 PROCEDURE — 84443 ASSAY THYROID STIM HORMONE: CPT

## 2018-06-13 LAB — SURGICAL PATHOLOGY STUDY: SIGNIFICANT CHANGE UP

## 2018-06-18 DIAGNOSIS — Z85.118 PERSONAL HISTORY OF OTHER MALIGNANT NEOPLASM OF BRONCHUS AND LUNG: ICD-10-CM

## 2018-06-18 DIAGNOSIS — S02.611A FRACTURE OF CONDYLAR PROCESS OF RIGHT MANDIBLE, INITIAL ENCOUNTER FOR CLOSED FRACTURE: ICD-10-CM

## 2018-06-18 DIAGNOSIS — S02.672A: ICD-10-CM

## 2018-06-18 DIAGNOSIS — S01.81XA LACERATION WITHOUT FOREIGN BODY OF OTHER PART OF HEAD, INITIAL ENCOUNTER: ICD-10-CM
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
## 2018-06-18 DIAGNOSIS — I44.0 ATRIOVENTRICULAR BLOCK, FIRST DEGREE: ICD-10-CM

## 2018-06-18 DIAGNOSIS — S02.612A FRACTURE OF CONDYLAR PROCESS OF LEFT MANDIBLE, INITIAL ENCOUNTER FOR CLOSED FRACTURE: ICD-10-CM

## 2018-06-18 DIAGNOSIS — S02.671A: ICD-10-CM

## 2018-06-18 DIAGNOSIS — S02.5XXA FRACTURE OF TOOTH (TRAUMATIC), INITIAL ENCOUNTER FOR CLOSED FRACTURE: ICD-10-CM

## 2018-06-18 DIAGNOSIS — E87.1 HYPO-OSMOLALITY AND HYPONATREMIA: ICD-10-CM

## 2018-06-18 DIAGNOSIS — R55 SYNCOPE AND COLLAPSE: ICD-10-CM

## 2018-06-18 DIAGNOSIS — E78.5 HYPERLIPIDEMIA, UNSPECIFIED: ICD-10-CM

## 2018-06-18 DIAGNOSIS — Z23 ENCOUNTER FOR IMMUNIZATION: ICD-10-CM

## 2018-06-18 DIAGNOSIS — S02.66XA FRACTURE OF SYMPHYSIS OF MANDIBLE, INITIAL ENCOUNTER FOR CLOSED FRACTURE: ICD-10-CM
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
## 2021-01-26 PROBLEM — E78.5 HYPERLIPIDEMIA, UNSPECIFIED: Chronic | Status: ACTIVE | Noted: 2018-06-09

## 2021-01-26 PROBLEM — C34.92 MALIGNANT NEOPLASM OF UNSPECIFIED PART OF LEFT BRONCHUS OR LUNG: Chronic | Status: ACTIVE | Noted: 2018-06-09

## 2021-01-26 PROBLEM — R55 SYNCOPE AND COLLAPSE: Chronic | Status: ACTIVE | Noted: 2018-06-09

## 2021-01-28 ENCOUNTER — APPOINTMENT (OUTPATIENT)
Dept: OTOLARYNGOLOGY | Facility: CLINIC | Age: 76
End: 2021-01-28
Payer: MEDICARE

## 2021-01-28 VITALS
OXYGEN SATURATION: 98 % | BODY MASS INDEX: 21.48 KG/M2 | HEART RATE: 99 BPM | HEIGHT: 69 IN | TEMPERATURE: 98.3 F | WEIGHT: 145 LBS | SYSTOLIC BLOOD PRESSURE: 113 MMHG | DIASTOLIC BLOOD PRESSURE: 73 MMHG

## 2021-01-28 PROCEDURE — 99202 OFFICE O/P NEW SF 15 MIN: CPT

## 2021-01-28 NOTE — ASSESSMENT
[FreeTextEntry1] :  shows mild snhl- discussed with pt\par CAP test ordered as requested to see if there is central component - I explained that in the setting of snhl central loss cannot always be , but we can try\par we had emergency and he could not wait today for cap - taken to scheduling desk to set up

## 2021-01-28 NOTE — DATA REVIEWED
[de-identified] :  from Dr De León recently reviewed with pt-  b symmetric mild snhl approx 30 - 35 dB

## 2021-01-28 NOTE — HISTORY OF PRESENT ILLNESS
[de-identified] : 76 yo man who has noticed that he has had difficulty hearing for many years with certain parts of sentences and certain sounds. He had  with Dr De León and she has referred him for further workup. He wishes to understand the loss and see what can be done about it.

## 2021-02-12 ENCOUNTER — APPOINTMENT (OUTPATIENT)
Dept: OTOLARYNGOLOGY | Facility: CLINIC | Age: 76
End: 2021-02-12
Payer: MEDICARE

## 2021-02-12 ENCOUNTER — APPOINTMENT (OUTPATIENT)
Dept: PHARMACY | Facility: CLINIC | Age: 76
End: 2021-02-12
Payer: MEDICARE

## 2021-02-12 VITALS — TEMPERATURE: 96.8 F

## 2021-02-12 DIAGNOSIS — H93.25 CENTRAL AUDITORY PROCESSING DISORDER: ICD-10-CM

## 2021-02-12 DIAGNOSIS — H90.3 SENSORINEURAL HEARING LOSS, BILATERAL: ICD-10-CM

## 2021-02-12 PROCEDURE — Z5928: CPT | Mod: NC

## 2021-02-12 PROCEDURE — 92620 AUDITORY FUNCTION 60 MIN: CPT

## 2021-02-12 PROCEDURE — 99213 OFFICE O/P EST LOW 20 MIN: CPT

## 2021-02-12 NOTE — PHYSICAL EXAM
[Normal] : tympanic membranes are normal in both ears [de-identified] : r scant cerumen removed atraumatically with suction [de-identified] : gait steady

## 2021-02-12 NOTE — ASSESSMENT
[FreeTextEntry1] : reviewed CAP with pt\par cerumen removed, scant AD as he had been noticing r increased tinnitus\par brought to audiologists to adjust ha - if not satisfactory I asked for  to make sure hearing hadnt changed\par they all said it was ok\par I recommended mri - he said he had one recently and it was ok- I asked if he could pls send us result\par rtc as needed

## 2021-02-12 NOTE — HISTORY OF PRESENT ILLNESS
[de-identified] : 76 yo man with h/o hl and mild loss on exam had cap test today. He reports louder r tinnitus high pitched and continuous after ha adjustment for it. He has had LACE twice in the past.

## 2021-05-13 NOTE — H&P ADULT - PROBLEM/PLAN-2
Add 66635 Cpt? (Important Note: In 2017 The Use Of 58436 Is Being Tracked By Cms To Determine Future Global Period Reimbursement For Global Periods): no Detail Level: Detailed DISPLAY PLAN FREE TEXT

## 2021-07-09 NOTE — ED PROVIDER NOTE - MEDICAL DECISION MAKING DETAILS
No
s/p syncopal episode, no chest pain, no SOb, doubt PE, doubt dissection, no HA, doubt SAH.  +chin laceration and jaw pain  -check labs, ekg, ivf, ct head/facial/cspine, tetanus

## 2021-10-11 NOTE — H&P ADULT - PROBLEM SELECTOR PLAN 2
Patient is a 53-year male.  He is status post epigastric hernia repair with mesh.  No more drainage.  Come back for reevaluation next week, or earlier as needed.     Note was forwarded to Dr. Nazanin Paulson.     Portions of this note may have been created using the Dragon voice recognition system.  Errors in content may be related to improper recognition of the system.  Effort to review and correct the note has been made but irregularities may still be present.     Na improved from 125 to 127 after 1 L NS bolus in ED. Serum osmolality low.  Pt appears euvolemic on exam. Etiology likely 2/2 poor PO intake.   - C/w NS at 125/hr  - Q 4-6 hr BMP  - F/u urine lytes

## 2023-10-30 NOTE — PRE-OP CHECKLIST - NEEDLE GAUGE
----- Message from ANAMARIA Mckeon sent at 10/30/2023  8:28 AM CDT -----  Request for patient virtual visit to be cancelled for breathing concerns .  Please Triage patient complaint and notify of no charge disqualified visit.      20

## 2024-02-06 NOTE — ED PROVIDER NOTE - NEUROLOGICAL LEVEL OF CONSCIOUSNESS
If any questions arise, call your provider.  If your provider is not available, please feel free to call the [facility] at [phone].    MEDICATIONS: Resume taking daily medication.  Take prescribed pain medication with food.  If no medication is prescribed, you may take non-aspirin pain medication if needed.  PAIN MEDICATION CAN BE VERY CONSTIPATING.  Take a stool softener or laxative such as senokot, pericolace, or milk of magnesia if needed.    Last pain medication given: Toradol (anti-inflammatory similar to Ibuprofen/Motrin) at 8:30am, you may give Peng ibuprofen/motrin again after 2:30pm.    What to Expect Post Anesthesia    Rest and take it easy for the first 24 hours.  A responsible adult is recommended to remain with you during that time.  It is normal to feel sleepy.  We encourage you to not do anything that requires balance, judgment or coordination.    To avoid nausea, slowly advance diet as tolerated, avoiding spicy or greasy foods for the first day.  Add more substantial food to your diet according to your provider's instructions.  Babies can be fed formula or breast milk as soon as they are hungry.  INCREASE FLUIDS AND FIBER TO AVOID CONSTIPATION.    MILD FLU-LIKE SYMPTOMS ARE NORMAL.  YOU MAY EXPERIENCE GENERALIZED MUSCLE ACHES, THROAT IRRITATION, HEADACHE AND/OR SOME NAUSEA.           alert/follows commands